# Patient Record
(demographics unavailable — no encounter records)

---

## 2024-10-16 NOTE — REASON FOR VISIT
[Procedure Visit: _____] : [unfilled]  [FreeTextEntry2] : JEFFERY DARLING Has the history of the vasomotor rhinitis with a partial but insufficient response to Rhinaire.  Because of the persistence of symptoms it was felt that the patient would possibly benefit from the neuro rocio procedure.  I explained the risks and benefits again and the distinct possibility that this would also be not as effective as we would like.  The patient understood and agreed

## 2024-10-16 NOTE — ASSESSMENT
[FreeTextEntry1] : The patient had an uneventful Neuromark.  I suggested nasal saline and flonase and would like to see her back in 2 weeks.

## 2024-11-18 NOTE — HISTORY OF PRESENT ILLNESS
[Previously active] : previously active [Men] : men [FreeTextEntry1] : 54yo had early menopause, elevated fsh early on. normal paps no hot flashes no incontinance no  bleeding no bleeding since 51 vag dryness [Mammogramdate] : 11/6/24 [TextBox_19] : needed r dx going tomorrow and sono [BreastSonogramDate] : 11/6/24 [PapSmeardate] : 8/23 [BoneDensityDate] : 2022 [ColonoscopyDate] : 2023

## 2024-11-18 NOTE — DISCUSSION/SUMMARY
[FreeTextEntry1] : Discussed with the pt the importance of exercise weight bearing,yoga, aerobics good variety,  calcium totalling  mg between food and vitamins also vitamin D 2000iu daily, fish oil.  ALso that any drop of blood after menopause is not good. Encouraged SBE FU in one year.   Discussed vaginal lubricants OTC like luvena moisturizers,ky ovule and relplense . There are also vaginal estrogens, and coconut oil with intercourse. discussed colonoscopy  she does every 5 yrs her dad had colonand derma had one in the summer

## 2024-11-18 NOTE — PHYSICAL EXAM
[Chaperone Declined] : Patient declined chaperone [Appropriately responsive] : appropriately responsive [Alert] : alert [No Acute Distress] : no acute distress [No Lymphadenopathy] : no lymphadenopathy [Regular Rate Rhythm] : regular rate rhythm [No Murmurs] : no murmurs [Clear to Auscultation B/L] : clear to auscultation bilaterally [Soft] : soft [Non-tender] : non-tender [Non-distended] : non-distended [No HSM] : No HSM [No Lesions] : no lesions [No Mass] : no mass [Oriented x3] : oriented x3 [Examination Of The Breasts] : a normal appearance [No Masses] : no breast masses were palpable [Labia Majora] : normal [Labia Minora] : normal [Normal] : normal [Uterine Adnexae] : normal [No Tenderness] : no tenderness [Nl Sphincter Tone] : normal sphincter tone [FreeTextEntry9] : -mass-guiac

## 2024-11-25 NOTE — DISCUSSION/SUMMARY
[FreeTextEntry1] : IMPRESSION: Ms. DARLING is a 55 year old woman with a history of dyslipidemia, dilated proximal ascending aorta, family history of CAD, former tobacco use, elevated blood pressure, and coronary atherosclerosis who presents today for follow up of hyperlipidemia and her blood pressure.    PLAN: 1. She will continue on Rosuvastatin 5 mg daily and we should aim for a goal LDL of less than 70. Her most recent LDL from 5 months ago was excellent. She will go for blood work later this week. She should also be on ASA 81 mg daily given her coronary atherosclerosis and her PFO that was seen on her Cardiac CT performed over 6 years ago.  2. She had a mildly dilated proximal ascending aorta on her recent catscan, that was relatively stable. She had an echocardiogram in today that showed a stable mildly dilated aorta. She will also have a repeat echocardiogram in 6 months to follow up her dilated aorta.  3. Her blood pressure is fine and even better at home. She will continue to watch her diet and she will follow her blood pressure at home. We discussed the importance of good blood pressure control given her dilated aorta.   4. She had occasional PVCs on her ZIO patch. She did not have any ectopy on exam or on her ECG that was performed today. 5. She will follow up with me in 6 months or sooner if she is symptomatic. She will have a follow up echocardiogram at that time. [EKG obtained to assist in diagnosis and management of assessed problem(s)] : EKG obtained to assist in diagnosis and management of assessed problem(s)

## 2024-11-25 NOTE — CARDIOLOGY SUMMARY
[de-identified] : 11/25/2024: Sinus Bradycardia at 57 bpm [de-identified] : 3 day ZIO patch performed on 12/28/2022: Sinus Rhythm with occasional PVCs.  [de-identified] : Exercise Stress Test performed on 7/13/2018: Excellent exercise capacity (11 METS). No ischemic ECG changes.  [de-identified] : 5/20/2024: Grossly preserved left ventricular ejection fraction. EF is approximately 60%. Concentric left ventricular remodeling. Normal right ventricular cavity size and normal systolic function. The left atrium is mildly dilated. Mild mitral regurgitation. Thickened mitral valve leaflets with prolapse of the posterior mitral valve leaflet. Ascending aorta diameter is mildly dilated, measuring 4.30 cm. Estimated pulmonary artery systolic pressure is 22 mmHg. No echocardiographic evidence of pulmonary hypertension. Mild tricuspid regurgitation. Interatrial septum is aneurysmal. Mild aortic regurgitation. Trileaflet aortic valve with normal systolic excursion. There is calcification of the aortic valve leaflets.   11/15/2023: Endocardium not well visualized; grossly preserved left ventricular ejection fraction. EF is approximately 60-65%. Moderate left ventricular hypertrophy. There is mild (grade 1) left ventricular diastolic dysfunction.  Normal right ventricular cavity size and systolic function. The left atrium is moderately dilated. Mild mitral regurgitation. Mitral annular calcification with thickened mitral valve leaflets and prolapse of the posterior mitral valve leaflet. No echocardiographic evidence of pulmonary hypertension. Trace tricuspid regurgitation. Estimated pulmonary artery systolic pressure is 27 mmHg. Ascending aorta diameter is mildly dilated, measuring 4.30 cm. Interatrial septum is aneurysmal. Mild aortic regurgitation.  Trileaflet aortic valve with low normal systolic excursion. Calcification of the aortic valve leaflets. Trace pericardial effusion.   12/20/2022: Endocardium not well visualized; grossly preserved left ventricular ejection fraction. Mild diastolic dysfunction. EF is approximately 60%. Moderately dilated left atrium. Thickened mitral valve leaflets with mild prolapse of the posterior mitral valve leaflet. Mild mitral regurgitation. Calcified aortic valve with decreased opening. The aortic valve area is approximately 1.8 sqcm, by the continuity equation, which is consistent with mild aortic stenosis. Mild tricuspid regurgitation. The proximal ascending aorta is mildly dilated, measuring approximately 4.3 cm. [de-identified] : Calcium score performed on 5/4/2023: Calcium score is 3. The ascending aorta measures about 4 cm.   Calcium score performed on 11/15/2022: Total calcium score is 3 Agatston Units. Aortic root calcifications present. Dilated ascending  thoracic aorta measures 4.1 cm and previously was 3.8 cm on 8/14/2018 cardiac CT.  Cardiac CT performed on 8/14/2018; Calcium score of 0. Patent foramen ovale.

## 2024-11-25 NOTE — HISTORY OF PRESENT ILLNESS
[FreeTextEntry1] : Patient is a 55 year old woman with a history of dyslipidemia, dilated proximal ascending aorta, family history of CAD, former tobacco use, elevated blood pressure, and coronary atherosclerosis who presents today for follow up of hyperlipidemia and her blood pressure. She states that she on occasion, has felt a pulse in her mid abdomen when she is laying down flat, which is similar to what she has felt in the past. She is taking Rosuvastatin and stopped Mounjaro 6 weeks ago. She otherwise denies any chest pain, dyspnea, palpitations, and headaches. Her blood pressure has been good at home.

## 2024-11-25 NOTE — CARDIOLOGY SUMMARY
[de-identified] : 11/25/2024: Sinus Bradycardia at 57 bpm [de-identified] : 3 day ZIO patch performed on 12/28/2022: Sinus Rhythm with occasional PVCs.  [de-identified] : Exercise Stress Test performed on 7/13/2018: Excellent exercise capacity (11 METS). No ischemic ECG changes.  [de-identified] : 5/20/2024: Grossly preserved left ventricular ejection fraction. EF is approximately 60%. Concentric left ventricular remodeling. Normal right ventricular cavity size and normal systolic function. The left atrium is mildly dilated. Mild mitral regurgitation. Thickened mitral valve leaflets with prolapse of the posterior mitral valve leaflet. Ascending aorta diameter is mildly dilated, measuring 4.30 cm. Estimated pulmonary artery systolic pressure is 22 mmHg. No echocardiographic evidence of pulmonary hypertension. Mild tricuspid regurgitation. Interatrial septum is aneurysmal. Mild aortic regurgitation. Trileaflet aortic valve with normal systolic excursion. There is calcification of the aortic valve leaflets.   11/15/2023: Endocardium not well visualized; grossly preserved left ventricular ejection fraction. EF is approximately 60-65%. Moderate left ventricular hypertrophy. There is mild (grade 1) left ventricular diastolic dysfunction.  Normal right ventricular cavity size and systolic function. The left atrium is moderately dilated. Mild mitral regurgitation. Mitral annular calcification with thickened mitral valve leaflets and prolapse of the posterior mitral valve leaflet. No echocardiographic evidence of pulmonary hypertension. Trace tricuspid regurgitation. Estimated pulmonary artery systolic pressure is 27 mmHg. Ascending aorta diameter is mildly dilated, measuring 4.30 cm. Interatrial septum is aneurysmal. Mild aortic regurgitation.  Trileaflet aortic valve with low normal systolic excursion. Calcification of the aortic valve leaflets. Trace pericardial effusion.   12/20/2022: Endocardium not well visualized; grossly preserved left ventricular ejection fraction. Mild diastolic dysfunction. EF is approximately 60%. Moderately dilated left atrium. Thickened mitral valve leaflets with mild prolapse of the posterior mitral valve leaflet. Mild mitral regurgitation. Calcified aortic valve with decreased opening. The aortic valve area is approximately 1.8 sqcm, by the continuity equation, which is consistent with mild aortic stenosis. Mild tricuspid regurgitation. The proximal ascending aorta is mildly dilated, measuring approximately 4.3 cm. [de-identified] : Calcium score performed on 5/4/2023: Calcium score is 3. The ascending aorta measures about 4 cm.   Calcium score performed on 11/15/2022: Total calcium score is 3 Agatston Units. Aortic root calcifications present. Dilated ascending  thoracic aorta measures 4.1 cm and previously was 3.8 cm on 8/14/2018 cardiac CT.  Cardiac CT performed on 8/14/2018; Calcium score of 0. Patent foramen ovale.

## 2024-11-25 NOTE — PHYSICAL EXAM
[Well Developed] : well developed [Well Nourished] : well nourished [No Acute Distress] : no acute distress [Normal Conjunctiva] : normal conjunctiva [Normal Venous Pressure] : normal venous pressure [No Carotid Bruit] : no carotid bruit [Normal Rate] : normal [Rhythm Regular] : regular [Normal S1] : normal S1 [Normal S2] : normal S2 [I] : a grade 1 [No Pitting Edema] : no pitting edema present [Clear Lung Fields] : clear lung fields [Good Air Entry] : good air entry [No Respiratory Distress] : no respiratory distress  [Soft] : abdomen soft [Non Tender] : non-tender [Normal Bowel Sounds] : normal bowel sounds [Normal Gait] : normal gait [Gait - Sufficient for Exercise Testing] : gait - sufficient for exercise testing [No Edema] : no edema [No Cyanosis] : no cyanosis [No Rash] : no rash [Moves all extremities] : moves all extremities [No Focal Deficits] : no focal deficits [Normal Speech] : normal speech [Alert and Oriented] : alert and oriented [Normal memory] : normal memory [de-identified] : She has a palpable mid abdominal pulse without a bruit.  [S3] : no S3 [Right Carotid Bruit] : no bruit heard over the right carotid [Left Carotid Bruit] : no bruit heard over the left carotid [Bruit] : no bruit heard [de-identified] : Extraocular muscles intact. Anicteric sclerae. [de-identified] : Normal oral mucosa.  [de-identified] : No visible skin ulcers.

## 2024-11-25 NOTE — PHYSICAL EXAM
[Well Developed] : well developed [Well Nourished] : well nourished [No Acute Distress] : no acute distress [Normal Conjunctiva] : normal conjunctiva [Normal Venous Pressure] : normal venous pressure [No Carotid Bruit] : no carotid bruit [Normal Rate] : normal [Rhythm Regular] : regular [Normal S1] : normal S1 [Normal S2] : normal S2 [I] : a grade 1 [No Pitting Edema] : no pitting edema present [Clear Lung Fields] : clear lung fields [Good Air Entry] : good air entry [No Respiratory Distress] : no respiratory distress  [Soft] : abdomen soft [Non Tender] : non-tender [Normal Bowel Sounds] : normal bowel sounds [Normal Gait] : normal gait [Gait - Sufficient for Exercise Testing] : gait - sufficient for exercise testing [No Edema] : no edema [No Cyanosis] : no cyanosis [No Rash] : no rash [Moves all extremities] : moves all extremities [No Focal Deficits] : no focal deficits [Normal Speech] : normal speech [Alert and Oriented] : alert and oriented [Normal memory] : normal memory [de-identified] : She has a palpable mid abdominal pulse without a bruit.  [S3] : no S3 [Right Carotid Bruit] : no bruit heard over the right carotid [Left Carotid Bruit] : no bruit heard over the left carotid [Bruit] : no bruit heard [de-identified] : Extraocular muscles intact. Anicteric sclerae. [de-identified] : Normal oral mucosa.  [de-identified] : No visible skin ulcers.

## 2025-04-10 NOTE — DISCUSSION/SUMMARY
[FreeTextEntry1] : See health maintenance assessment and plan outlined below. In addition: See April 10,2024 chart note for recent lab and urine testing results and plan of care Had bone density December 2024 Vascular f/u 2025 as needed for varicose vein care Prescription given to do MR of LS spine 2025 given progressive lower back pain and right burning thigh pain despite exercise program///consider neurology and/or spinal orthopedic follow-up 2025 Had colonoscopy 12/2022 Saw GYN 11/2024 = due for f/u 11/2025 Had mammograms 2024 = due for f/u breast imaging in 11/2025 Urology f/u 2025 as needed Continue meds, diet, exercise as outlined EKG declined here today as does with cardiology Cardiology f/u 2025 CXR declined Vaccines d/w patient ENT f/u 2025 Dental f/u 2025 To see dermatology 2025 for full skin exam To see ophtho 2025 for complete eye exam RTC for annual physical and as needed To call for any medical issues or if her status worsens or changes and to return to be seen immediately All of the above was discussed in detail with her and all of her questions were answered She verbally confirmed understanding of all of the above and agreement with the above plan

## 2025-04-10 NOTE — ASSESSMENT
[Vaccines Reviewed] : Immunizations reviewed today. Please see immunization details in the vaccine log within the immunization flowsheet.  [FreeTextEntry1] : See narrative/discussion below

## 2025-04-10 NOTE — HEALTH RISK ASSESSMENT
[Very Good] : ~his/her~ current health as very good [Excellent] : ~his/her~  mood as  excellent [Yes] : Yes [Monthly or less (1 pt)] : Monthly or less (1 point) [1 or 2 (0 pts)] : 1 or 2 (0 points) [Never (0 pts)] : Never (0 points) [No] : In the past 12 months have you used drugs other than those required for medical reasons? No [No falls in past year] : Patient reported no falls in the past year [0] : 2) Feeling down, depressed, or hopeless: Not at all (0) [PHQ-2 Negative - No further assessment needed] : PHQ-2 Negative - No further assessment needed [PHQ-9 Negative - No further assessment needed] : PHQ-9 Negative - No further assessment needed [Former] : Former [> 15 Years] : > 15 Years [Patient reported mammogram was normal] : Patient reported mammogram was normal [Patient reported PAP Smear was normal] : Patient reported PAP Smear was normal [Patient reported bone density results were normal] : Patient reported bone density results were normal [Patient reported colonoscopy was normal] : Patient reported colonoscopy was normal [HIV Test offered] : HIV Test offered [Hepatitis C test offered] : Hepatitis C test offered [None] : None [Alone] : lives alone [# of Members in Household ___] :  household currently consist of [unfilled] member(s) [Employed] : employed [College] : College [] :  [# Of Children ___] : has [unfilled] children [Feels Safe at Home] : Feels safe at home [Fully functional (bathing, dressing, toileting, transferring, walking, feeding)] : Fully functional (bathing, dressing, toileting, transferring, walking, feeding) [Fully functional (using the telephone, shopping, preparing meals, housekeeping, doing laundry, using] : Fully functional and needs no help or supervision to perform IADLs (using the telephone, shopping, preparing meals, housekeeping, doing laundry, using transportation, managing medications and managing finances) [Smoke Detector] : smoke detector [Carbon Monoxide Detector] : carbon monoxide detector [Seat Belt] :  uses seat belt [Sunscreen] : uses sunscreen [With Patient/Caregiver] : , with patient/caregiver [Reviewed no changes] : Reviewed, no changes [Designated Healthcare Proxy] : Designated healthcare proxy [Name: ___] : Health Care Proxy's Name: [unfilled]  [Relationship: ___] : Relationship: [unfilled] [FreeTextEntry1] : Lower back and right lower extremity burning pain [Little interest or pleasure doing things] : 1) Little interest or pleasure doing things [Feeling down, depressed, or hopeless] : 2) Feeling down, depressed, or hopeless [de-identified] : GYN, dentist, ophtho, derm, cardiology, urology [Audit-CScore] : 1 [de-identified] : walking, weights [de-identified] : regular  [[id number]] : 0 [de-identified] : 2 cigs/day for 15 years = stopped 20 years ago [NO] : No [Change in mental status noted] : No change in mental status noted [Language] : denies difficulty with language [Behavior] : denies difficulty with behavior [Learning/Retaining New Information] : denies difficulty learning/retaining new information [Handling Complex Tasks] : denies difficulty handling complex tasks [Reasoning] : denies difficulty with reasoning [Spatial Ability and Orientation] : denies difficulty with spatial ability and orientation [Reports changes in hearing] : Reports no changes in hearing [Reports changes in vision] : Reports no changes in vision [Reports changes in dental health] : Reports no changes in dental health [Travel to Developing Areas] : does not  travel to developing areas [TB Exposure] : is not being exposed to tuberculosis [Caregiver Concerns] : does not have caregiver concerns [MammogramDate] : 11/24 [PapSmearDate] : 11/24 [BoneDensityDate] : 12/24 [ColonoscopyDate] : 12/22 [FreeTextEntry2] :  at [facility] [AdvancecareDate] : 04/25 [FreeTextEntry4] : [phone]

## 2025-04-10 NOTE — HEALTH RISK ASSESSMENT
[Very Good] : ~his/her~ current health as very good [Excellent] : ~his/her~  mood as  excellent [Yes] : Yes [Monthly or less (1 pt)] : Monthly or less (1 point) [1 or 2 (0 pts)] : 1 or 2 (0 points) [Never (0 pts)] : Never (0 points) [No] : In the past 12 months have you used drugs other than those required for medical reasons? No [No falls in past year] : Patient reported no falls in the past year [0] : 2) Feeling down, depressed, or hopeless: Not at all (0) [PHQ-2 Negative - No further assessment needed] : PHQ-2 Negative - No further assessment needed [PHQ-9 Negative - No further assessment needed] : PHQ-9 Negative - No further assessment needed [Former] : Former [> 15 Years] : > 15 Years [Patient reported mammogram was normal] : Patient reported mammogram was normal [Patient reported PAP Smear was normal] : Patient reported PAP Smear was normal [Patient reported bone density results were normal] : Patient reported bone density results were normal [Patient reported colonoscopy was normal] : Patient reported colonoscopy was normal [HIV Test offered] : HIV Test offered [Hepatitis C test offered] : Hepatitis C test offered [None] : None [Alone] : lives alone [# of Members in Household ___] :  household currently consist of [unfilled] member(s) [Employed] : employed [College] : College [] :  [# Of Children ___] : has [unfilled] children [Feels Safe at Home] : Feels safe at home [Fully functional (bathing, dressing, toileting, transferring, walking, feeding)] : Fully functional (bathing, dressing, toileting, transferring, walking, feeding) [Fully functional (using the telephone, shopping, preparing meals, housekeeping, doing laundry, using] : Fully functional and needs no help or supervision to perform IADLs (using the telephone, shopping, preparing meals, housekeeping, doing laundry, using transportation, managing medications and managing finances) [Smoke Detector] : smoke detector [Carbon Monoxide Detector] : carbon monoxide detector [Seat Belt] :  uses seat belt [Sunscreen] : uses sunscreen [With Patient/Caregiver] : , with patient/caregiver [Reviewed no changes] : Reviewed, no changes [Designated Healthcare Proxy] : Designated healthcare proxy [Name: ___] : Health Care Proxy's Name: [unfilled]  [Relationship: ___] : Relationship: [unfilled] [FreeTextEntry1] : Lower back and right lower extremity burning pain [Little interest or pleasure doing things] : 1) Little interest or pleasure doing things [Feeling down, depressed, or hopeless] : 2) Feeling down, depressed, or hopeless [de-identified] : GYN, dentist, ophtho, derm, cardiology, urology [Audit-CScore] : 1 [de-identified] : walking, weights [de-identified] : regular  [[id number]] : 0 [de-identified] : 2 cigs/day for 15 years = stopped 20 years ago [NO] : No [Change in mental status noted] : No change in mental status noted [Language] : denies difficulty with language [Behavior] : denies difficulty with behavior [Learning/Retaining New Information] : denies difficulty learning/retaining new information [Handling Complex Tasks] : denies difficulty handling complex tasks [Reasoning] : denies difficulty with reasoning [Spatial Ability and Orientation] : denies difficulty with spatial ability and orientation [Reports changes in hearing] : Reports no changes in hearing [Reports changes in vision] : Reports no changes in vision [Reports changes in dental health] : Reports no changes in dental health [Travel to Developing Areas] : does not  travel to developing areas [TB Exposure] : is not being exposed to tuberculosis [Caregiver Concerns] : does not have caregiver concerns [MammogramDate] : 11/24 [PapSmearDate] : 11/24 [BoneDensityDate] : 12/24 [ColonoscopyDate] : 12/22 [FreeTextEntry2] :  at [facility] [AdvancecareDate] : 04/25 [FreeTextEntry4] : [phone]

## 2025-04-10 NOTE — HISTORY OF PRESENT ILLNESS
[FreeTextEntry1] : Comes in for annual physical. [de-identified] : Feeling well.\par  Had covid 5/2022.\par  Had mild case with no residual effects.

## 2025-04-10 NOTE — PHYSICAL EXAM
[No Acute Distress] : no acute distress [Well Nourished] : well nourished [Well Developed] : well developed [Well-Appearing] : well-appearing [Normal Voice/Communication] : normal voice/communication [Normal Sclera/Conjunctiva] : normal sclera/conjunctiva [PERRL] : pupils equal round and reactive to light [EOMI] : extraocular movements intact [Normal Outer Ear/Nose] : the outer ears and nose were normal in appearance [Normal Oropharynx] : the oropharynx was normal [Normal TMs] : both tympanic membranes were normal [No JVD] : no jugular venous distention [Supple] : supple [No Lymphadenopathy] : no lymphadenopathy [No Respiratory Distress] : no respiratory distress  [Clear to Auscultation] : lungs were clear to auscultation bilaterally [No Accessory Muscle Use] : no accessory muscle use [Normal Rate] : normal rate  [Regular Rhythm] : with a regular rhythm [Normal S1, S2] : normal S1 and S2 [No Carotid Bruits] : no carotid bruits [Pedal Pulses Present] : the pedal pulses are present [No Edema] : there was no peripheral edema [No Extremity Clubbing/Cyanosis] : no extremity clubbing/cyanosis [Normal Appearance] : normal in appearance [No Nipple Discharge] : no nipple discharge [No Axillary Lymphadenopathy] : no axillary lymphadenopathy [Soft] : abdomen soft [Non Tender] : non-tender [Non-distended] : non-distended [No Masses] : no abdominal mass palpated [No HSM] : no HSM [Normal Bowel Sounds] : normal bowel sounds [Declined Rectal Exam] : declined rectal exam [Normal Supraclavicular Nodes] : no supraclavicular lymphadenopathy [Normal Axillary Nodes] : no axillary lymphadenopathy [Normal Posterior Cervical Nodes] : no posterior cervical lymphadenopathy [Normal Anterior Cervical Nodes] : no anterior cervical lymphadenopathy [No CVA Tenderness] : no CVA  tenderness [No Spinal Tenderness] : no spinal tenderness [Grossly Normal Strength/Tone] : grossly normal strength/tone [No Rash] : no rash [Normal Gait] : normal gait [Coordination Grossly Intact] : coordination grossly intact [No Focal Deficits] : no focal deficits [Speech Grossly Normal] : speech grossly normal [Normal Affect] : the affect was normal [Alert and Oriented x3] : oriented to person, place, and time [Memory Grossly Normal] : memory grossly normal [Normal Mood] : the mood was normal [Normal Insight/Judgement] : insight and judgment were intact [de-identified] : Wearing glasses [de-identified] : No ST [de-identified] : No TM or stridor [de-identified] : R=16 [de-identified] : normal radial pulses; no cords [de-identified] : As per GYN

## 2025-04-10 NOTE — PHYSICAL EXAM
[No Acute Distress] : no acute distress [Well Nourished] : well nourished [Well Developed] : well developed [Well-Appearing] : well-appearing [Normal Voice/Communication] : normal voice/communication [Normal Sclera/Conjunctiva] : normal sclera/conjunctiva [PERRL] : pupils equal round and reactive to light [EOMI] : extraocular movements intact [Normal Outer Ear/Nose] : the outer ears and nose were normal in appearance [Normal Oropharynx] : the oropharynx was normal [Normal TMs] : both tympanic membranes were normal [No JVD] : no jugular venous distention [Supple] : supple [No Lymphadenopathy] : no lymphadenopathy [No Respiratory Distress] : no respiratory distress  [Clear to Auscultation] : lungs were clear to auscultation bilaterally [No Accessory Muscle Use] : no accessory muscle use [Normal Rate] : normal rate  [Regular Rhythm] : with a regular rhythm [Normal S1, S2] : normal S1 and S2 [No Carotid Bruits] : no carotid bruits [Pedal Pulses Present] : the pedal pulses are present [No Edema] : there was no peripheral edema [No Extremity Clubbing/Cyanosis] : no extremity clubbing/cyanosis [Normal Appearance] : normal in appearance [No Nipple Discharge] : no nipple discharge [No Axillary Lymphadenopathy] : no axillary lymphadenopathy [Soft] : abdomen soft [Non Tender] : non-tender [Non-distended] : non-distended [No Masses] : no abdominal mass palpated [No HSM] : no HSM [Normal Bowel Sounds] : normal bowel sounds [Declined Rectal Exam] : declined rectal exam [Normal Supraclavicular Nodes] : no supraclavicular lymphadenopathy [Normal Axillary Nodes] : no axillary lymphadenopathy [Normal Posterior Cervical Nodes] : no posterior cervical lymphadenopathy [Normal Anterior Cervical Nodes] : no anterior cervical lymphadenopathy [No CVA Tenderness] : no CVA  tenderness [No Spinal Tenderness] : no spinal tenderness [Grossly Normal Strength/Tone] : grossly normal strength/tone [No Rash] : no rash [Normal Gait] : normal gait [Coordination Grossly Intact] : coordination grossly intact [No Focal Deficits] : no focal deficits [Speech Grossly Normal] : speech grossly normal [Normal Affect] : the affect was normal [Alert and Oriented x3] : oriented to person, place, and time [Memory Grossly Normal] : memory grossly normal [Normal Mood] : the mood was normal [Normal Insight/Judgement] : insight and judgment were intact [de-identified] : Wearing glasses [de-identified] : No ST [de-identified] : No TM or stridor [de-identified] : R=16 [de-identified] : normal radial pulses; no cords [de-identified] : As per GYN

## 2025-04-10 NOTE — HISTORY OF PRESENT ILLNESS
[FreeTextEntry1] : Comes in for annual physical. [de-identified] : Feeling well.\par  Had covid 5/2022.\par  Had mild case with no residual effects.

## 2025-04-29 NOTE — HISTORY OF PRESENT ILLNESS
[FreeTextEntry1] : f/u spots [de-identified] : 55 year old F presenting with below  #Spots scattered on body x years. Asymptomatic and unchanged. No alleviating/aggravating factors. Never been treated.  Derm hx: Atypical spot on L back removed in [place] 13-14 years ago No personal history of skin cancer.  Family hx of BCC in mother Strong family hx of colon cancer; patient gets colonoscopy every 5 years since age 30 (negative this year) Social hx: [employer]; also part of [employer] workgroup. Hx of tanning remotely

## 2025-04-29 NOTE — ASSESSMENT
[FreeTextEntry1] : 1) Sebaceous hyperplasia, face - Benign, reassurance provided - No intervention  2) Multiple melanocytic nevi, benign  - Monitor moles for changes - Recommend wearing hats and sun protective clothing or OTC sunscreen products (SPF 30+, broad band, EltaMD/La Roche Posay/Neutrogena) daily on your face and entire body (apply sunscreen atleast 30 minutes prior to going outside). Reapply sunscreen every 2 hours when outside.  3) Nevus Spilus, R abdomen - Benign, reassurance provided - No intervention  4) Claudio Angioma - including R lower lip I have discussed the benign nature and usual course with the patient, reassured.  5) Screening exam for skin cancer Total body skin exam performed except fingernails. Exam was not remarkable for any lesions suspicious for malignancy.  - Recommended skin exam in 12 months - Recommended monthly self skin exams  - Recommended sun protection with SPF 30 or greater labeled broad spectrum, long sleeves and pants, as well as a hat and sunglasses  - Discussed the ABCDEs of melanoma  6) Lentigines, medial buttocks - Benign - Patient with strong family hx of colon cancer, she has been getting screened since age 30 with colonoscopy every 5 years. Last screening negative.  7) Seborrheic dermatitis, mild, posterior scalp - chronic; stable off topicals - Pt defers topicals RX

## 2025-04-29 NOTE — PHYSICAL EXAM
[Alert] : alert [Oriented x 3] : ~L oriented x 3 [Well Nourished] : well nourished [Conjunctiva Non-injected] : conjunctiva non-injected [No Visual Lymphadenopathy] : no visual  lymphadenopathy [No Clubbing] : no clubbing [No Edema] : no edema [No Bromhidrosis] : no bromhidrosis [No Chromhidrosis] : no chromhidrosis [Full Body Skin Exam Performed] : performed [FreeTextEntry3] : General: Alert and oriented, in NAD.  All of the following were examined and were within normal limits, except as noted:   Scalp: Face, including eyelids, nose, lips, ears, oropharynx: Neck: Chest/Back/Abdomen: Bilateral Arms/Hands: Bilateral Legs/Feet: Buttocks, Genitalia, Anus/perineum:  	 Hair, Toenails, Oral Mucosa, Eyes:   - unable to examine fingernails due to polish - greasy scaling on posterior scalp - Fairly uniform and regular brown macules and papules on the trunk and extremities - Several scattered red partially blanching papules on the trunk and extremities, including R lower lip - Yellow papules with central dell on face - Grouped brown macules on mid abdomen that does not cross midline - cluster of brown macules on gluteal cleft

## 2025-05-11 NOTE — HISTORY OF PRESENT ILLNESS
[de-identified] : 05/07/2025 - 56 Y F presents for initial evaluation with chief complaint of low back and RT lateral thigh pain present since January 2025. Has remote history of symptoms dating back to [id number]. PCP prescribed NSAID at the time and pain was tolerable throughout the years. January 2025 went to stretch session and had deep tissue massage with significant increase in back and right thigh pain. MRI was ordered. Present day feels severity is improved since January but not to baseline. Takes ibuprofen sparingly. Pain worse lying down at nighttime. There is no lower extremity weakness.   The patient is a 56 year female who presents today complaining of low back pain Date of Injury/Onset:  Chronic Pain:    At Rest: 7/10  With Activity:  4/10  Mechanism of injury: NKI Quality of symptoms: achy, right thigh burning, and stabbing pain Improves with: mild activity, working out, Ibuprofen Worse with: Pilates, stretching, deep tissue massage Prior treatment: no Prior Imaging: [facility]  Additional Information:

## 2025-05-11 NOTE — DISCUSSION/SUMMARY
[de-identified] : 56 Y F w/ L3/4 L4/5 disc herniations & spinal stenosis  Discussed conservative treatments in the form of OTC NSAIDs, core strengthening exercises, and spinal steroid injections. Will remain conservative at this time. More urgency for decompressive surgery if there is progression of motor or neurological deficits. I am prescribing the patient MDP for pain relief. Titration schedule provided. Resume NSAID after completing oral steroid taper. Patient was provided with a referral for lumbar lower extremity / IT band physical therapy to work on stretching, strengthening and range of motion. Discussed interventional injections if patient has unsatisfactory relief following medical management and PT. F/u 6 weeks.    Prior to appointment and during encounter with patient extensive medical records were reviewed including but not limited to, hospital records, outpatient records, imaging results, and lab data. During this appointment the patient was examined, diagnoses were discussed and explained in a face to face manner. In addition extensive time was spent reviewing aforementioned diagnostic studies. Counseling including abnormal image results, differential diagnoses, treatment options, risk and benefits, lifestyle changes, current condition, and current medications was performed. Patient's comments, questions, and concerns were addressed and patient verbalized understanding. Based on this patient's presentation at our office, which is an orthopedic spine surgeon's office, this patient inherently / intrinsically has a risk, however minute, of developing issues such as Cauda equina syndrome, bowel and bladder changes, or progression of motor or neurological deficits such as paralysis which may be permanent.   RENEE ESCOBAR Acting as a Scribe for Renee Munoz, attest that this documentation has been prepared under the direction and in the presence of Provider Amandeep Shoemaker MD.

## 2025-05-11 NOTE — PHYSICAL EXAM
[Normal Coordination] : normal coordination [Normal DTR UE/LE] : normal DTR UE/LE  [Normal Sensation] : normal sensation [Normal Mood and Affect] : normal mood and affect [Oriented] : oriented [Able to Communicate] : able to communicate [Normal Skin] : normal skin [No Rash] : no rash [No Ulcers] : no ulcers [No Lesions] : no lesions [No obvious lymphadenopathy in areas examined] : no obvious lymphadenopathy in areas examined [Well Developed] : well developed [Well Nourished] : well nourished [Peripheral vascular exam is grossly normal] : peripheral vascular exam is grossly normal [No Respiratory Distress] : no respiratory distress [Lungs clear to auscultation bilaterally] : lungs clear to auscultation bilaterally [Normal Bowel Sounds] : normal bowel sounds [Non-Tender] : non-tender [No HSM] : no HSM [No Mass] : no mass [NL (90)] : forward flexion 90 degrees [NL (30)] : right lateral rotation 30 degrees [NL (45)] : extension 45 degrees [NL (40)] : right lateral bending 40 degrees [5___] : right extensor hallicus longus 5[unfilled]/5 [] : non-antalgic

## 2025-05-11 NOTE — DATA REVIEWED
[FreeTextEntry1] : On my interpretation of these images & reports [facility] 4/16/25 L3-L4: there is moderate bilateral facet arthropathy with moderate spinal canal stenosis and mild/moderate left neural foraminal stenosis L4/5: Broad-based disc bulge with annular fissuring and moderate bilateral facet arthropathy with thickening of ligamentum flavum results in moderate spinal canal stenosis and moderate right greater than left neural foraminal stenosis.  I stop paperwork reviewed MED progress notes reviewed

## 2025-05-19 NOTE — CARDIOLOGY SUMMARY
[de-identified] : 5/19/2025: Sinus Rhythm with a first degree AV block at 66 beats per minute with low voltage in precordial leads.  [de-identified] : 3 day ZIO patch performed on 12/28/2022: Sinus Rhythm with occasional PVCs.  [de-identified] : Exercise Stress Test performed on 7/13/2018: Excellent exercise capacity (11 METS). No ischemic ECG changes.  [de-identified] : 5/20/2024: Grossly preserved left ventricular ejection fraction. EF is approximately 60%. Concentric left ventricular remodeling. Normal right ventricular cavity size and normal systolic function. The left atrium is mildly dilated. Mild mitral regurgitation. Thickened mitral valve leaflets with prolapse of the posterior mitral valve leaflet. Ascending aorta diameter is mildly dilated, measuring 4.30 cm. Estimated pulmonary artery systolic pressure is 22 mmHg. No echocardiographic evidence of pulmonary hypertension. Mild tricuspid regurgitation. Interatrial septum is aneurysmal. Mild aortic regurgitation. Trileaflet aortic valve with normal systolic excursion. There is calcification of the aortic valve leaflets.   11/15/2023: Endocardium not well visualized; grossly preserved left ventricular ejection fraction. EF is approximately 60-65%. Moderate left ventricular hypertrophy. There is mild (grade 1) left ventricular diastolic dysfunction.  Normal right ventricular cavity size and systolic function. The left atrium is moderately dilated. Mild mitral regurgitation. Mitral annular calcification with thickened mitral valve leaflets and prolapse of the posterior mitral valve leaflet. No echocardiographic evidence of pulmonary hypertension. Trace tricuspid regurgitation. Estimated pulmonary artery systolic pressure is 27 mmHg. Ascending aorta diameter is mildly dilated, measuring 4.30 cm. Interatrial septum is aneurysmal. Mild aortic regurgitation.  Trileaflet aortic valve with low normal systolic excursion. Calcification of the aortic valve leaflets. Trace pericardial effusion.   12/20/2022: Endocardium not well visualized; grossly preserved left ventricular ejection fraction. Mild diastolic dysfunction. EF is approximately 60%. Moderately dilated left atrium. Thickened mitral valve leaflets with mild prolapse of the posterior mitral valve leaflet. Mild mitral regurgitation. Calcified aortic valve with decreased opening. The aortic valve area is approximately 1.8 sqcm, by the continuity equation, which is consistent with mild aortic stenosis. Mild tricuspid regurgitation. The proximal ascending aorta is mildly dilated, measuring approximately 4.3 cm. [de-identified] : Calcium score performed on 5/4/2023: Calcium score is 3. The ascending aorta measures about 4 cm.   Calcium score performed on 11/15/2022: Total calcium score is 3 Agatston Units. Aortic root calcifications present. Dilated ascending  thoracic aorta measures 4.1 cm and previously was 3.8 cm on 8/14/2018 cardiac CT.  Cardiac CT performed on 8/14/2018; Calcium score of 0. Patent foramen ovale.

## 2025-05-19 NOTE — DISCUSSION/SUMMARY
[EKG obtained to assist in diagnosis and management of assessed problem(s)] : EKG obtained to assist in diagnosis and management of assessed problem(s) [FreeTextEntry1] : IMPRESSION: Ms. DARLING is a 56 year old woman with a history of dyslipidemia, dilated proximal ascending aorta, family history of CAD, former tobacco use, elevated blood pressure, and coronary atherosclerosis who presents today for follow up of hyperlipidemia and her blood pressure.    PLAN: 1. She will continue on Rosuvastatin 10 mg daily and we should aim for a goal LDL of less than 70. Her most recent LDL from April 2025 was above goal. She will go for blood work next month. She should also be on ASA 81 mg daily given her coronary atherosclerosis and her PFO that was seen on her Cardiac CT performed a little less than 7 years ago.  2. She had a mildly dilated proximal ascending aorta on her recent catscan, that was relatively stable. She had an echocardiogram in today that showed a stable mildly dilated aorta. She will also have a repeat echocardiogram in 6 months to follow up her dilated aorta.  3. Her blood pressure is fine and even better at home. She will continue to watch her diet and she will follow her blood pressure at home. We discussed the importance of good blood pressure control given her dilated aorta.   4. She had occasional PVCs on her ZIO patch. She did not have any ectopy on exam or on her ECG that was performed today. 5. She will follow up with me in 6 months or sooner if she is symptomatic. She will have a follow up echocardiogram at that time. 6.She will schedule a calcium score sometime between now and the time of her next visit in 6 months so that we can follow-up for any progression of her coronary atherosclerosis.

## 2025-05-19 NOTE — CARDIOLOGY SUMMARY
[de-identified] : 5/19/2025: Sinus Rhythm with a first degree AV block at 66 beats per minute with low voltage in precordial leads.  [de-identified] : 3 day ZIO patch performed on 12/28/2022: Sinus Rhythm with occasional PVCs.  [de-identified] : Exercise Stress Test performed on 7/13/2018: Excellent exercise capacity (11 METS). No ischemic ECG changes.  [de-identified] : 5/20/2024: Grossly preserved left ventricular ejection fraction. EF is approximately 60%. Concentric left ventricular remodeling. Normal right ventricular cavity size and normal systolic function. The left atrium is mildly dilated. Mild mitral regurgitation. Thickened mitral valve leaflets with prolapse of the posterior mitral valve leaflet. Ascending aorta diameter is mildly dilated, measuring 4.30 cm. Estimated pulmonary artery systolic pressure is 22 mmHg. No echocardiographic evidence of pulmonary hypertension. Mild tricuspid regurgitation. Interatrial septum is aneurysmal. Mild aortic regurgitation. Trileaflet aortic valve with normal systolic excursion. There is calcification of the aortic valve leaflets.   11/15/2023: Endocardium not well visualized; grossly preserved left ventricular ejection fraction. EF is approximately 60-65%. Moderate left ventricular hypertrophy. There is mild (grade 1) left ventricular diastolic dysfunction.  Normal right ventricular cavity size and systolic function. The left atrium is moderately dilated. Mild mitral regurgitation. Mitral annular calcification with thickened mitral valve leaflets and prolapse of the posterior mitral valve leaflet. No echocardiographic evidence of pulmonary hypertension. Trace tricuspid regurgitation. Estimated pulmonary artery systolic pressure is 27 mmHg. Ascending aorta diameter is mildly dilated, measuring 4.30 cm. Interatrial septum is aneurysmal. Mild aortic regurgitation.  Trileaflet aortic valve with low normal systolic excursion. Calcification of the aortic valve leaflets. Trace pericardial effusion.   12/20/2022: Endocardium not well visualized; grossly preserved left ventricular ejection fraction. Mild diastolic dysfunction. EF is approximately 60%. Moderately dilated left atrium. Thickened mitral valve leaflets with mild prolapse of the posterior mitral valve leaflet. Mild mitral regurgitation. Calcified aortic valve with decreased opening. The aortic valve area is approximately 1.8 sqcm, by the continuity equation, which is consistent with mild aortic stenosis. Mild tricuspid regurgitation. The proximal ascending aorta is mildly dilated, measuring approximately 4.3 cm. [de-identified] : Calcium score performed on 5/4/2023: Calcium score is 3. The ascending aorta measures about 4 cm.   Calcium score performed on 11/15/2022: Total calcium score is 3 Agatston Units. Aortic root calcifications present. Dilated ascending  thoracic aorta measures 4.1 cm and previously was 3.8 cm on 8/14/2018 cardiac CT.  Cardiac CT performed on 8/14/2018; Calcium score of 0. Patent foramen ovale.

## 2025-05-19 NOTE — HISTORY OF PRESENT ILLNESS
[FreeTextEntry1] : Patient is a 56 year old woman with a history of dyslipidemia, dilated proximal ascending aorta, family history of CAD, former tobacco use, elevated blood pressure, and coronary atherosclerosis who presents today for follow up of hyperlipidemia and her blood pressure. She states that she started doing pilates in January and then her back pain worsened. She states that she was given a Medrol dose pack at that time. She states that she on occasion, has felt a pulse in her mid abdomen when she is laying down flat, which is similar to what she has felt in the past. She is taking Rosuvastatin 10 mg daily for the past 4-5 weeks. She otherwise denies any chest pain, dyspnea, palpitations, and headaches. Her blood pressure has been good at home.

## 2025-05-19 NOTE — PHYSICAL EXAM
[Well Developed] : well developed [Well Nourished] : well nourished [No Acute Distress] : no acute distress [Normal Conjunctiva] : normal conjunctiva [Normal Venous Pressure] : normal venous pressure [No Carotid Bruit] : no carotid bruit [Normal Rate] : normal [Rhythm Regular] : regular [Normal S1] : normal S1 [Normal S2] : normal S2 [I] : a grade 1 [No Pitting Edema] : no pitting edema present [Clear Lung Fields] : clear lung fields [Good Air Entry] : good air entry [No Respiratory Distress] : no respiratory distress  [Soft] : abdomen soft [Non Tender] : non-tender [Normal Bowel Sounds] : normal bowel sounds [Normal Gait] : normal gait [Gait - Sufficient for Exercise Testing] : gait - sufficient for exercise testing [No Edema] : no edema [No Cyanosis] : no cyanosis [No Rash] : no rash [Moves all extremities] : moves all extremities [No Focal Deficits] : no focal deficits [Normal Speech] : normal speech [Alert and Oriented] : alert and oriented [Normal memory] : normal memory [S3] : no S3 [Right Carotid Bruit] : no bruit heard over the right carotid [Left Carotid Bruit] : no bruit heard over the left carotid [Bruit] : no bruit heard [de-identified] : Extraocular muscles intact. Anicteric sclerae. [de-identified] : Normal oral mucosa.  [de-identified] : No visible skin ulcers.

## 2025-05-19 NOTE — PHYSICAL EXAM
[Well Developed] : well developed [Well Nourished] : well nourished [No Acute Distress] : no acute distress [Normal Conjunctiva] : normal conjunctiva [Normal Venous Pressure] : normal venous pressure [No Carotid Bruit] : no carotid bruit [Normal Rate] : normal [Rhythm Regular] : regular [Normal S1] : normal S1 [Normal S2] : normal S2 [I] : a grade 1 [No Pitting Edema] : no pitting edema present [Clear Lung Fields] : clear lung fields [Good Air Entry] : good air entry [No Respiratory Distress] : no respiratory distress  [Soft] : abdomen soft [Non Tender] : non-tender [Normal Bowel Sounds] : normal bowel sounds [Normal Gait] : normal gait [Gait - Sufficient for Exercise Testing] : gait - sufficient for exercise testing [No Edema] : no edema [No Cyanosis] : no cyanosis [No Rash] : no rash [Moves all extremities] : moves all extremities [No Focal Deficits] : no focal deficits [Normal Speech] : normal speech [Alert and Oriented] : alert and oriented [Normal memory] : normal memory [S3] : no S3 [Right Carotid Bruit] : no bruit heard over the right carotid [Left Carotid Bruit] : no bruit heard over the left carotid [Bruit] : no bruit heard [de-identified] : Extraocular muscles intact. Anicteric sclerae. [de-identified] : Normal oral mucosa.  [de-identified] : No visible skin ulcers.

## 2025-07-17 NOTE — HISTORY OF PRESENT ILLNESS
[de-identified] : 07/16/25: Patient returns to the office for a follow up. She reports that she has been actively engaged in outpatient physical therapy since last visit. There does not appear to be any significant change in her right leg symptoms. She is experiencing a combination of hypersensitivity pain and numbness on the lateral aspect of her right thigh. No symptoms distal to the knee. No subjective weakness. Continues to utilize anti-inflammatory medication. Not complaining of any significant back pain  05/07/2025 - 56 Y F presents for initial evaluation with chief complaint of low back and RT lateral thigh pain present since January 2025. Has remote history of symptoms dating back to [id number]. PCP prescribed NSAID at the time and pain was tolerable throughout the years. January 2025 went to stretch session and had deep tissue massage with significant increase in back and right thigh pain. MRI was ordered. Present day feels severity is improved since January but not to baseline. Takes ibuprofen sparingly. Pain worse lying down at nighttime. There is no lower extremity weakness.   The patient is a 56 year female who presents today complaining of low back pain Date of Injury/Onset:  Chronic Pain:    At Rest: 7/10  With Activity:  4/10  Mechanism of injury: NKI Quality of symptoms: achy, right thigh burning, and stabbing pain Improves with: mild activity, working out, Ibuprofen Worse with: Pilates, stretching, deep tissue massage Prior treatment: no Prior Imaging: [facility]  Additional Information:

## 2025-07-17 NOTE — DATA REVIEWED
[FreeTextEntry1] : On my interpretation of these images & reports [facility] 4/16/25 L3-L4: there is moderate bilateral facet arthropathy with moderate spinal canal stenosis and mild/moderate left neural foraminal stenosis L4/5: Broad-based disc bulge with annular fissuring and moderate bilateral facet arthropathy with thickening of ligamentum flavum results in moderate spinal canal stenosis and moderate right greater than left neural foraminal stenosis.  I stop paperwork reviewed MED progress notes reviewed PT progress notes reviewed

## 2025-07-17 NOTE — PHYSICAL EXAM
[Normal Coordination] : normal coordination [Normal DTR UE/LE] : normal DTR UE/LE  [Normal Sensation] : normal sensation [Normal Mood and Affect] : normal mood and affect [Oriented] : oriented [Able to Communicate] : able to communicate [Normal Skin] : normal skin [No Rash] : no rash [No Ulcers] : no ulcers [No Lesions] : no lesions [No obvious lymphadenopathy in areas examined] : no obvious lymphadenopathy in areas examined [Well Developed] : well developed [Well Nourished] : well nourished [Peripheral vascular exam is grossly normal] : peripheral vascular exam is grossly normal [No Respiratory Distress] : no respiratory distress [NL (90)] : forward flexion 90 degrees [NL (30)] : right lateral rotation 30 degrees [NL (45)] : extension 45 degrees [NL (40)] : right lateral bending 40 degrees [5___] : right extensor hallicus longus 5[unfilled]/5 [] : non-antalgic [de-identified] : positive for altered sensation right lower extremity

## 2025-07-17 NOTE — PHYSICAL EXAM
[Normal Coordination] : normal coordination [Normal DTR UE/LE] : normal DTR UE/LE  [Normal Sensation] : normal sensation [Normal Mood and Affect] : normal mood and affect [Oriented] : oriented [Able to Communicate] : able to communicate [Normal Skin] : normal skin [No Rash] : no rash [No Ulcers] : no ulcers [No Lesions] : no lesions [No obvious lymphadenopathy in areas examined] : no obvious lymphadenopathy in areas examined [Well Developed] : well developed [Well Nourished] : well nourished [Peripheral vascular exam is grossly normal] : peripheral vascular exam is grossly normal [No Respiratory Distress] : no respiratory distress [NL (90)] : forward flexion 90 degrees [NL (30)] : right lateral rotation 30 degrees [NL (45)] : extension 45 degrees [NL (40)] : right lateral bending 40 degrees [5___] : right extensor hallicus longus 5[unfilled]/5 [] : non-antalgic [de-identified] : positive for altered sensation right lower extremity

## 2025-07-17 NOTE — HISTORY OF PRESENT ILLNESS
[de-identified] : 07/16/25: Patient returns to the office for a follow up. She reports that she has been actively engaged in outpatient physical therapy since last visit. There does not appear to be any significant change in her right leg symptoms. She is experiencing a combination of hypersensitivity pain and numbness on the lateral aspect of her right thigh. No symptoms distal to the knee. No subjective weakness. Continues to utilize anti-inflammatory medication. Not complaining of any significant back pain  05/07/2025 - 56 Y F presents for initial evaluation with chief complaint of low back and RT lateral thigh pain present since January 2025. Has remote history of symptoms dating back to [id number]. PCP prescribed NSAID at the time and pain was tolerable throughout the years. January 2025 went to stretch session and had deep tissue massage with significant increase in back and right thigh pain. MRI was ordered. Present day feels severity is improved since January but not to baseline. Takes ibuprofen sparingly. Pain worse lying down at nighttime. There is no lower extremity weakness.   The patient is a 56 year female who presents today complaining of low back pain Date of Injury/Onset:  Chronic Pain:    At Rest: 7/10  With Activity:  4/10  Mechanism of injury: NKI Quality of symptoms: achy, right thigh burning, and stabbing pain Improves with: mild activity, working out, Ibuprofen Worse with: Pilates, stretching, deep tissue massage Prior treatment: no Prior Imaging: [facility]  Additional Information: